# Patient Record
Sex: MALE | Race: WHITE | NOT HISPANIC OR LATINO | Employment: FULL TIME | URBAN - METROPOLITAN AREA
[De-identification: names, ages, dates, MRNs, and addresses within clinical notes are randomized per-mention and may not be internally consistent; named-entity substitution may affect disease eponyms.]

---

## 2019-05-06 ENCOUNTER — OFFICE VISIT (OUTPATIENT)
Dept: ORTHOPEDIC SURGERY | Facility: CLINIC | Age: 44
End: 2019-05-06

## 2019-05-06 VITALS — HEIGHT: 71 IN | HEART RATE: 72 BPM | OXYGEN SATURATION: 98 %

## 2019-05-06 DIAGNOSIS — M25.561 RIGHT KNEE PAIN, UNSPECIFIED CHRONICITY: Primary | ICD-10-CM

## 2019-05-06 DIAGNOSIS — M76.31 ILIOTIBIAL BAND SYNDROME OF RIGHT SIDE: ICD-10-CM

## 2019-05-06 PROCEDURE — 99203 OFFICE O/P NEW LOW 30 MIN: CPT | Performed by: ORTHOPAEDIC SURGERY

## 2019-05-06 RX ORDER — NYSTATIN 100000 [USP'U]/G
POWDER TOPICAL
Refills: 0 | COMMUNITY
Start: 2019-05-02

## 2019-05-06 RX ORDER — FLUTICASONE PROPIONATE 50 MCG
SPRAY, SUSPENSION (ML) NASAL
Refills: 6 | COMMUNITY
Start: 2019-03-13

## 2019-05-06 NOTE — PROGRESS NOTES
[facility] Note    Subjective     Chief Complaint   Patient presents with   • Right Knee - Pain     Pain present for around six to eight weeks. No falls or trauma. No previous treatment.        HPI      Tho Mishra is a 43 y.o. male.  He complains of right knee pain.  He had for about 8 weeks.  Insidious onset.  He is  next month.  Pain is 7 out of 10.  It is burning and stabbing over his proximal tibia laterally.        History reviewed. No pertinent past medical history.   History reviewed. No pertinent surgical history.   History reviewed. No pertinent family history.  Social History     Socioeconomic History   • Marital status:      Spouse name: Not on file   • Number of children: Not on file   • Years of education: Not on file   • Highest education level: Not on file   Tobacco Use   • Smoking status: Never Smoker   • Smokeless tobacco: Never Used   Substance and Sexual Activity   • Alcohol use: No     Frequency: Never   • Drug use: Defer   • Sexual activity: Defer      Current Outpatient Medications on File Prior to Visit   Medication Sig Dispense Refill   • fluticasone (FLONASE) 50 MCG/ACT nasal spray USE 2 YO 2 SPRAYS BY NASAL ROUTE EVERY DAY IN EACH NOSTRIL AS NEEDED  6   • nystatin (MYCOSTATIN) 129017 UNIT/GM powder 1 APPLICATION 2-3 TIMES PER DAY  0     No current facility-administered medications on file prior to visit.       Allergies   Allergen Reactions   • Keflex [Cephalexin] Hives        The following portions of the patient's history were reviewed and updated as appropriate: allergies, current medications, past family history, past medical history, past social history, past surgical history and problem list.    Review of Systems   Constitutional: Positive for activity change.   HENT: Positive for sneezing.    Eyes: Positive for itching.   Respiratory: Negative.    Cardiovascular: Negative.    Gastrointestinal: Negative.    Endocrine:  "Negative.    Genitourinary: Negative.    Musculoskeletal: Positive for arthralgias (right knee) and joint swelling.   Skin: Positive for rash.   Allergic/Immunologic: Positive for environmental allergies.   Neurological: Positive for headaches.   Hematological: Negative.    Psychiatric/Behavioral: Negative.         Objective      Physical Exam  Pulse 72   Ht 180.3 cm (71\")   SpO2 98%     There is no height or weight on file to calculate BMI.        GENERAL APPEARANCE: awake, alert & oriented x 3, in no acute distress and well developed, well nourished  PSYCH: normal mood and affect  LUNGS:  breathing nonlabored, no wheezing  EYES: sclera anicteric, pupils equal  CARDIOVASCULAR: palpable pulses dorsalis pedis, palpable posterior tibial bilaterally. Capillary refill less than 2 seconds  INTEGUMENTARY: skin intact, no clubbing, cyanosis  NEUROLOGIC:  Normal Sensation and reflexes             Ortho Exam  Peripheral Vascular:    Upper Extremity:   Inspection:  Left--no cyanotic nail beds Right--no cyanotic nail beds   Bilateral:  Pink nail beds with brisk capillary refill   Palpation:  Bilateral radial pulse normal  Musculoskeletal:  Global Assessment:  Overall assessment of Lower Extremity Muscle Strength and Tone:  Right quadriceps--5/5  Right hamstrings--5/5  Right tibialis anterior--5/5  Right gastroc soleus--5/5  Right EHL--5/5  Lower Extremity:  Knee/Patella:  No digital clubbing or cyanosis.    Examination of right knee reveals:  Normal deep tendon reflexes, coordination, strength, tone, sensation.  No known fractures or deformities.  Inspection and Palpation:    Right knee:  Tenderness: Gerdy'ss tubercle of the tibia  Effusion:  none  Crepitus:  none  Pulses:  2  Ecchymosis:  None  Warmth:  None   ROM:  Right:  Extension:0    Flexion:135  Left:  Extension:0     Flexion:135  Instability:  Right:  Lachman Test:  Negative, Varus stress test negative,   Valgus stress test negative, Posterior Drawer Test:  " Negative  Deformities/Malalignments/Discrepancies:    Left:  none  Right:  none  Functional Testing:  Right:  Ignacia's test:  Negative  Patella grind test:  Negative  Q-angle:  Normal  Apprehension Sign:  Negative        Imaging/Studies  Imaging Results (last 7 days)     Procedure Component Value Units Date/Time    XR Knee 4+ View Right [[id number]] Resulted:  05/06/19 0920     Updated:  05/06/19 0920    Narrative:       Knee X-Ray  Indication: Pain    Upright AP of bilateral knees. Lateral, skiers and Sunrise views of right   knee     Findings:  No fracture  No bony lesion  Normal soft tissues  Normal joint spaces    No prior studies were available for comparison.            Assessment/Plan        ICD-10-CM ICD-9-CM   1. Right knee pain, unspecified chronicity M25.561 719.46   2. Iliotibial band syndrome of right side M76.31 728.89       Orders Placed This Encounter   Procedures   • XR Knee 4+ View Right   • Ambulatory Referral to Physical Therapy      He has symptoms consistent with tendinitis.  Will start physical therapy.  He will take anti-inflammatories.  He will follow-up in 4 weeks.  If he is better he would cancel.  He may do activity as tolerated.    Medical Decision Making  Management Options : over-the-counter medicine and physical/occupational therapy  Data/Risk: radiology tests and independent visualization of imaging, lab tests, or EMG/NCV    Boris Suarez MD  05/06/19  9:29 AM         EMR Dragon/Transcription disclaimer:  Much of this encounter note is an electronic transcription of spoken language to printed text. Electronic transcription of spoken language may permit erroneous, or at times, nonsensical words or phrases to be inadvertently transcribed. Although I have reviewed the note for such errors, some may still exist.

## 2019-05-13 ENCOUNTER — HOSPITAL ENCOUNTER (OUTPATIENT)
Dept: PHYSICAL THERAPY | Facility: HOSPITAL | Age: 44
Setting detail: THERAPIES SERIES
Discharge: HOME | End: 2019-05-13

## 2019-05-13 DIAGNOSIS — M76.31 IT BAND SYNDROME, RIGHT: Primary | ICD-10-CM

## 2019-05-13 PROCEDURE — 97161 PT EVAL LOW COMPLEX 20 MIN: CPT | Performed by: PHYSICAL THERAPIST

## 2019-05-13 NOTE — THERAPY EVALUATION
Outpatient Physical Therapy Ortho Initial Evaluation  [facility]     Patient Name: Tho Mishra  : 1975  MRN: [id number]  Today's Date: 2019      Visit Date: 2019    There is no problem list on file for this patient.       No past medical history on file.     No past surgical history on file.    Visit Dx:     ICD-10-CM ICD-9-CM   1. It band syndrome, right M76.31 728.89         Patient History     Row Name 19 0900             History    Chief Complaint  Pain  -CR      Type of Pain  Knee pain  -CR      Date Current Problem(s) Began  19  -CR      Brief Description of Current Complaint  42 yo male with right knee pain with kneeling .  He reports pain has remained status quo over the past 8 weeks.  He works as a  which is not limited by chief complaint.  He currenlty denies pain with stair negotiation, squatting, and or walking.  Pain is only elicited with kneeling on knee.  ROM is grossly unaffected.      -CR      Patient/Caregiver Goals  Relieve pain  -CR      Current Tobacco Use  no  -CR      Smoking Status  no  -CR      Patient's Rating of General Health  Good  -CR      Occupation/sports/leisure activities    -CR      Patient seeing anyone else for problem(s)?  Dr. Suarez  -CR      How has patient tried to help current problem?  Active avoidance of aggravating activity  -CR      What clinical tests have you had for this problem?  X-ray  -CR      Results of Clinical Tests  (-)   -CR         Pain     Pain Location  Knee  -CR      Pain at Present  0  -CR      Pain at Worst  8  -CR      Pain Frequency  Intermittent  -CR      Pain Description  Pressure;Sharp  -CR      What Performance Factors Make the Current Problem(s) WORSE?  kneeling  -CR      What Performance Factors Make the Current Problem(s) BETTER?  avoidance of kneeling   -CR      Pain Comments  only elicited with kneeling on affected knee  -CR      Is your sleep disturbed?  No  -CR       Difficulties at work?  no  -CR      Difficulties with ADL's?  yes, kneeling in the yard  -CR      Difficulties with recreational activities?  yes, yard work   -CR         Fall Risk Assessment    Any falls in the past year:  No  -CR      Does patient have a fear of falling  No  -CR         Daily Activities    Primary Language  English  -CR      How does patient learn best?  Demonstration  -CR      Teaching needs identified  Home Exercise Program;Management of Condition  -CR      Patient is concerned about/has problems with  Performing home management (household chores, shopping, care of dependents)  -CR      Does patient have problems with the following?  None  -CR      Barriers to learning  None  -CR      Functional Status  mobility issues preventing performance of daily activities  -CR      Pt Participated in POC and Goals  Yes  -CR         Safety    Are you being hurt, hit, or frightened by anyone at home or in your life?  No  -CR      Are you being neglected by a caregiver  No  -CR        User Key  (r) = Recorded By, (t) = Taken By, (c) = Cosigned By    Initials Name Provider Type    Tho Cuenca, PT Physical Therapist          PT Ortho     Row Name 05/13/19 0900       Special Tests/Palpation    Special Tests/Palpation  Knee  -CR       Knee Palpation    Knee Palpation?  Yes  -CR    Prepatellar Bursa  Tender  -CR    Lateral Joint Line  Tender  -CR       Patellar Accessory Motions    Patellar Accessory Motions Tested?  Yes  -CR    Superior glide  WNL  -CR    Inferior glide  WNL  -CR    Medial glide  WNL  -CR    Lateral glide  WNL  -CR    Lateral tilt  WNL  -CR       Knee Special Tests    Valgus stress (MCL lesion)  Negative;Right:  -CR    Varus stress (LCL lesion)  Right:;Negative  -CR    Ignacia’s test (meniscal lesion)  Right:;Negative  -CR    Patellar grind test (chondromalacia patella)  Right:;Negative  -CR    Noble compression test (distal ITB syndrome)  Right:;Positive  -CR    Prone knee  flexion test (tight quads vs femoral neural tension)  Right:;Positive  -CR       General ROM    RT Lower Ext  Rt Knee Extension/Flexion  -CR       Right Lower Ext    Rt Knee Extension/Flexion AROM  0/145  -CR       MMT (Manual Muscle Testing)    Rt Lower Ext  Rt Knee Extension;Rt Knee Flexion  -CR       MMT Right Lower Ext    Rt Knee Extension MMT, Gross Movement  (4/5) good  -CR    Rt Knee Flexion MMT, Gross Movement  (4/5) good  -CR    Rt Lower Extremity Comments   -- No pain with OKC knee flex/ext testing  -CR       Sensation    Sensation WNL?  WNL  -CR       Flexibility    Flexibility Tested?  Lower Extremity  -CR       Lower Extremity Flexibility    Hamstrings  Bilateral:;Moderately limited  -CR    Hip Flexors  Bilateral:;Moderately limited  -CR    Quadriceps  Bilateral:;Moderately limited  -CR    ITB  Bilateral:;Moderately limited  -CR       Transfers    Sit-Stand Coal (Transfers)  independent  -CR    Stand-Sit Coal (Transfers)  independent  -CR       Gait/Stairs Assessment/Training    Coal Level (Gait)  independent  -CR      User Key  (r) = Recorded By, (t) = Taken By, (c) = Cosigned By    Initials Name Provider Type    Tho Cuenca, PT Physical Therapist                      Therapy Education  Education Details: Client provided written HEP including quad stretch, HS stretch, IT band stretch, and SLR strengthening  Given: HEP, Symptoms/condition management, Pain management  Program: New  How Provided: Verbal, Written, Demonstration  Provided to: Patient  Level of Understanding: Verbalized, Demonstrated     PT OP Goals     Row Name 05/13/19 1000          PT Short Term Goals    STG Date to Achieve  06/10/19  -CR     STG 1  Client will be independent with initial HEP  -CR     STG 1 Progress  New  -CR     STG 2  Client will demonstrate knee strength 5/5  -CR     STG 2 Progress  New  -CR     STG 3  Client will report decreased symptoms by 25% in right anterior knee  -CR     STG 3  Progress  New  -CR        Long Term Goals    LTG Date to Achieve  07/08/19  -CR     LTG 1  client will be independent with comprehensive HEP   -CR     LTG 1 Progress  New  -CR     LTG 2  client will report abolished right knee symptoms   -CR     LTG 2 Progress  New  -CR     LTG 3  client will demonstrate flexibility WNL   -CR     LTG 3 Progress  New  -CR        Time Calculation    PT Goal Re-Cert Due Date  08/11/19  -CR       User Key  (r) = Recorded By, (t) = Taken By, (c) = Cosigned By    Initials Name Provider Type    Tho Cuenca, PT Physical Therapist          PT Assessment/Plan     Row Name 05/13/19 1039          PT Assessment    Functional Limitations  Performance in leisure activities;Performance in self-care ADL;Performance in work activities  -CR     Impairments  Pain;Muscle strength;Impaired flexibility  -CR     Assessment Comments  43 to male present with evolving symptoms of low complexity.  Right knee pain began following heavy kneeling activity 8 weeks ago.  He demonstrates limitations in flexibility including quad and IT band, TTP alonge inferior patellar pole and laterl joint line,  and reports limitations in kneeling ability.  At this time, he denies any restrictions in ROM, difficulty with stair negotiation, or any giving way or effusion.  He will benefit from skilled therapy to assist with addressing flexibility limtiations and progression towards clients goals.    -CR     Please refer to paper survey for additional self-reported information  Yes  -CR     Rehab Potential  Excellent  -CR     Patient/caregiver participated in establishment of treatment plan and goals  Yes  -CR     Patient would benefit from skilled therapy intervention  Yes  -CR        PT Plan    PT Frequency  1x/week  -CR     Predicted Duration of Therapy Intervention (Therapy Eval)  8 visits  -CR     Planned CPT's?  PT EVAL LOW COMPLEXITY: 10026;PT RE-EVAL: 88176;PT MANUAL THERAPY EA 15 MIN: 41160;PT HOT/COLD PACK WC  NONMCARE: 76806;PT THER PROC EA 15 MIN: 34564;PT NEUROMUSC RE-EDUCATION EA 15 MIN: 49277;PT THER ACT EA 15 MIN: 73737;PT GAIT TRAINING EA 15 MIN: 81010;PT SELF CARE/HOME MGMT/TRAIN EA 15: 36989;PT SELF CARE/MGMT/TRAIN 15 MIN: 21007  -CR     Physical Therapy Interventions (Optional Details)  ROM (Range of Motion);stair training;home exercise program;joint mobilization;manual therapy techniques  -CR     PT Plan Comments  flexibility, quad strengtheneing, glute strenghtening, body mechanics and HEP development  -CR       User Key  (r) = Recorded By, (t) = Taken By, (c) = Cosigned By    Initials Name Provider Type    Tho Cuenca, PT Physical Therapist                              Outcome Measure Options: Lower Extremity Functional Scale (LEFS)  Lower Extremity Functional Index  Any of your usual work, housework or school activities: A little bit of difficulty  Your usual hobbies, recreational or sporting activities: A little bit of difficulty  Getting into or out of the bath: No difficulty  Walking between rooms: No difficulty  Putting on your shoes or socks: No difficulty  Squatting: A little bit of difficulty  Lifting an object, like a bag of groceries from the floor: No difficulty  Performing light activities around your home: No difficulty  Performing heavy activities around your home: A little bit of difficulty  Getting into or out of a car: No difficulty  Walking 2 blocks: No difficulty  Walking a mile: No difficulty  Going up or down 10 stairs (about 1 flight of stairs): No difficulty  Standing for 1 hour: No difficulty  Sitting for 1 hour: No difficulty  Running on even ground: No difficulty  Running on uneven ground: No difficulty  Making sharp turns while running fast: No difficulty  Hopping: No difficulty  Rolling over in bed: No difficulty  Total: 76      Time Calculation:     Start Time: 0945     Therapy Charges for Today     Code Description Service Date Service Provider Modifiers Qty     77376965874  PT EVAL LOW COMPLEXITY 3 5/13/2019 Tho Lombardo, PT GP 1          PT G-Codes  Outcome Measure Options: Lower Extremity Functional Scale (LEFS)  Total: 76         Tho Lombardo, PT  5/13/2019

## 2019-05-24 ENCOUNTER — HOSPITAL ENCOUNTER (OUTPATIENT)
Dept: PHYSICAL THERAPY | Facility: HOSPITAL | Age: 44
Setting detail: THERAPIES SERIES
Discharge: HOME | End: 2019-05-24

## 2019-05-24 DIAGNOSIS — M76.31 IT BAND SYNDROME, RIGHT: Primary | ICD-10-CM

## 2019-05-24 PROCEDURE — 97110 THERAPEUTIC EXERCISES: CPT | Performed by: PHYSICAL THERAPIST

## 2019-05-24 NOTE — THERAPY TREATMENT NOTE
Outpatient Physical Therapy Ortho Treatment Note  [facility]     Patient Name: Tho Mishra  : 1975  MRN: [id number]  Today's Date: 2019      Visit Date: 2019    Visit Dx:    ICD-10-CM ICD-9-CM   1. It band syndrome, right M76.31 728.89       There is no problem list on file for this patient.       No past medical history on file.     No past surgical history on file.                    PT Assessment/Plan     Row Name 19 0904          PT Assessment    Assessment Comments  Addressing ITB interventions today reveals difficutly wiht ITB wall stretch.  Modified program to include foam rolling and ITB work with strap stretch enables client to kneel without distal pain today.  Client will follow up with PT next week to re assess carryover prior to MD appointment.   -CR        PT Plan    PT Plan Comments  Re assess foam roll and distal ITB stretch with strap   -CR       User Key  (r) = Recorded By, (t) = Taken By, (c) = Cosigned By    Initials Name Provider Type    Tho Cuenca, PT Physical Therapist            Exercises     Row Name 19 0800             Subjective Comments    Subjective Comments  Client reports he continues to experience similar discomfort in lateral knee joint line with kneeling.  No pain reported with ambulation or ADLs at this time.   -CR         Subjective Pain    Able to rate subjective pain?  yes  -CR      Pre-Treatment Pain Level  0  -CR      Post-Treatment Pain Level  0  -CR         Total Minutes    34705 - PT Therapeutic Exercise Minutes  25  -CR         Exercise 1    Exercise Name 1  Elliptical  -CR      Time 1  6  -CR         Exercise 2    Exercise Name 2  ITB stretch   -CR      Reps 2  5  -CR      Time 2  10  -CR         Exercise 3    Exercise Name 3  Foam rolling   -CR      Time 3  5  -CR         Exercise 4    Exercise Name 4  wall ITB stretch   -CR      Reps 4  5  -CR      Time 4  10 seconds  -CR         Exercise 5    Exercise Name 5   therapist assisted HS, ITB, quad stretch, hip stretch  -CR      Reps 5  5  -CR      Time 5  10 seconds   -CR      Additional Comments  all motions  -CR        User Key  (r) = Recorded By, (t) = Taken By, (c) = Cosigned By    Initials Name Provider Type    Tho Cuenca, PT Physical Therapist                                          Time Calculation:   Start Time: 0825  Therapy Charges for Today     Code Description Service Date Service Provider Modifiers Qty    50802856720  PT THER PROC EA 15 MIN 5/24/2019 Tho Lombardo, PT GP 2                    Tho Lombardo, PT  5/24/2019

## 2019-05-30 ENCOUNTER — HOSPITAL ENCOUNTER (OUTPATIENT)
Dept: PHYSICAL THERAPY | Facility: HOSPITAL | Age: 44
Setting detail: THERAPIES SERIES
Discharge: HOME | End: 2019-05-30

## 2019-05-30 DIAGNOSIS — M76.31 IT BAND SYNDROME, RIGHT: Primary | ICD-10-CM

## 2019-05-30 PROCEDURE — 97110 THERAPEUTIC EXERCISES: CPT | Performed by: PHYSICAL THERAPIST

## 2019-05-30 NOTE — THERAPY TREATMENT NOTE
Outpatient Physical Therapy Ortho Treatment Note  [facility]     Patient Name: Tho Mishra  : 1975  MRN: [id number]  Today's Date: 2019      Visit Date: 2019    Visit Dx:    ICD-10-CM ICD-9-CM   1. It band syndrome, right M76.31 728.89       There is no problem list on file for this patient.       No past medical history on file.     No past surgical history on file.                    PT Assessment/Plan     Row Name 19 1546          PT Assessment    Assessment Comments  Client currently demonstrates resolution of symptoms and self managment with ITB strap stretch and foam rolling. He willl follow up with therapy if needed in 2 weeks.  He will trial HEP at this time and overall has done very well.   -CR        PT Plan    PT Plan Comments  Follow up as needed in 2 weeks.  Continue with self care and HEP management  -CR       User Key  (r) = Recorded By, (t) = Taken By, (c) = Cosigned By    Initials Name Provider Type    Tho Cuenca PT Physical Therapist            Exercises     Row Name 19 1500             Subjective Comments    Subjective Comments  Client reports since previous treatments feels interventions have been effective at limiting knee pain   -CR         Subjective Pain    Able to rate subjective pain?  yes  -CR      Pre-Treatment Pain Level  0  -CR      Post-Treatment Pain Level  0  -CR         Total Minutes    03468 - PT Therapeutic Exercise Minutes  23  -CR         Exercise 1    Exercise Name 1  Elliptical  -CR      Time 1  8  -CR         Exercise 2    Exercise Name 2  ITB stretch   -CR      Reps 2  5  -CR      Time 2  10  -CR      Additional Comments  strap stretch  -CR         Exercise 3    Exercise Name 3  Foam rolling   -CR      Time 3  5  -CR         Exercise 4    Exercise Name 4  review of HEP included in TE   -CR        User Key  (r) = Recorded By, (t) = Taken By, (c) = Cosigned By    Initials Name Provider Type    Tho Cuenca, PT  Physical Therapist                           Therapy Education  Education Details: Reviewed HEP  Program: Reinforced              Time Calculation:   Start Time: 1515  Therapy Charges for Today     Code Description Service Date Service Provider Modifiers Qty    36894649540 HC PT THER PROC EA 15 MIN 5/30/2019 Tho Lombardo, PT GP 2                    Tho Lombardo, PT  5/30/2019

## 2019-06-03 ENCOUNTER — OFFICE VISIT (OUTPATIENT)
Dept: ORTHOPEDIC SURGERY | Facility: CLINIC | Age: 44
End: 2019-06-03

## 2019-06-03 VITALS — HEART RATE: 56 BPM | BODY MASS INDEX: 26.2 KG/M2 | OXYGEN SATURATION: 97 % | HEIGHT: 71 IN | WEIGHT: 187.17 LBS

## 2019-06-03 DIAGNOSIS — M76.51 PATELLAR TENDINITIS OF RIGHT KNEE: Primary | ICD-10-CM

## 2019-06-03 PROCEDURE — 99212 OFFICE O/P EST SF 10 MIN: CPT | Performed by: ORTHOPAEDIC SURGERY

## 2019-06-03 NOTE — PROGRESS NOTES
[facility] [place] [facility] [place]    Subjective     Chief Complaint   Patient presents with   • Right Knee - Follow-up     4 week follow up        HPI      Tho Mishra is a 44 y.o. male.  He is doing better.  He is slowly improving physical therapy.  Pain is 3 out of 10 it is burning stabbing and shooting.        History reviewed. No pertinent past medical history.   History reviewed. No pertinent surgical history.   History reviewed. No pertinent family history.  Social History     Socioeconomic History   • Marital status:      Spouse name: Not on file   • Number of children: Not on file   • Years of education: Not on file   • Highest education level: Not on file   Tobacco Use   • Smoking status: Never Smoker   • Smokeless tobacco: Never Used   Substance and Sexual Activity   • Alcohol use: No     Frequency: Never   • Drug use: Defer   • Sexual activity: Defer      Current Outpatient Medications on File Prior to Visit   Medication Sig Dispense Refill   • fluticasone (FLONASE) 50 MCG/ACT nasal spray USE 2 YO 2 SPRAYS BY NASAL ROUTE EVERY DAY IN EACH NOSTRIL AS NEEDED  6   • nystatin (MYCOSTATIN) 651616 UNIT/GM powder 1 APPLICATION 2-3 TIMES PER DAY  0     No current facility-administered medications on file prior to visit.       Allergies   Allergen Reactions   • Keflex [Cephalexin] Hives        The following portions of the patient's history were reviewed and updated as appropriate: allergies, current medications, past family history, past medical history, past social history, past surgical history and problem list.    Review of Systems   Constitutional: Positive for activity change.   HENT: Negative.    Eyes: Negative.    Respiratory: Negative.    Cardiovascular: Negative.    Gastrointestinal: Negative.    Endocrine: Negative.    Genitourinary: Negative.    Musculoskeletal: Positive for arthralgias.   Skin: Negative.    Allergic/Immunologic: Negative.    Neurological: Negative.   "  Hematological: Negative.    Psychiatric/Behavioral: Negative.         Objective      Physical Exam  Pulse 56   Ht 180.3 cm (71\")   Wt 84.9 kg (187 lb 2.7 oz)   SpO2 97%   BMI 26.11 kg/m²     Body mass index is 26.11 kg/m².        GENERAL APPEARANCE: awake, alert & oriented x 3, in no acute distress and well developed, well nourished  PSYCH: normal mood and affect    Ortho Exam  Peripheral Vascular:    Upper Extremity:   Inspection:  Left--no cyanotic nail beds Right--no cyanotic nail beds   Bilateral:  Pink nail beds with brisk capillary refill   Palpation:  Bilateral radial pulse normal  Musculoskeletal:  Global Assessment:  Overall assessment of Lower Extremity Muscle Strength and Tone:  Right quadriceps--5/5  Right hamstrings--5/5  Right tibialis anterior--5/5  Right gastroc soleus--5/5  Right EHL--5/5  Lower Extremity:  Knee/Patella:  No digital clubbing or cyanosis.    Examination of right knee reveals:  Normal deep tendon reflexes, coordination, strength, tone, sensation.  No known fractures or deformities.  Inspection and Palpation:    Right knee:  Tenderness: Patella tendon and tibial tubercle  Effusion:  none  Crepitus:  none  Pulses:  2+  Ecchymosis:  None  Warmth:  None   ROM:  Right:  Extension:0    Flexion:135  Left:  Extension:0     Flexion:135  Instability:  Right:  Lachman Test:  Negative, Varus stress test negative,   Valgus stress test negative, Posterior Drawer Test:  Negative  Deformities/Malalignments/Discrepancies:    Left:  none  Right:  none  Functional Testing:  Right:  Ignacia's test:  Negative  Patella grind test:  Negative  Q-angle:  Normal  Apprehension Sign:  Negative        Imaging/Studies  Imaging Results (last 7 days)     ** No results found for the last 168 hours. **          Assessment/Plan        ICD-10-CM ICD-9-CM   1. Patellar tendinitis of right knee M76.51 726.64       Orders Placed This Encounter   Procedures   • Ambulatory Referral to Physical Therapy      He will " continue physical therapy and follow-up as needed.  He is getting better.  Medical Decision Making  Management Options : over-the-counter medicine and physical/occupational therapy    Boris Suarez MD  06/03/19  9:41 AM         EMR Dragon/Transcription disclaimer:  Much of this encounter note is an electronic transcription of spoken language to printed text. Electronic transcription of spoken language may permit erroneous, or at times, nonsensical words or phrases to be inadvertently transcribed. Although I have reviewed the note for such errors, some may still exist.

## 2019-06-12 ENCOUNTER — DOCUMENTATION (OUTPATIENT)
Dept: PHYSICAL THERAPY | Facility: HOSPITAL | Age: 44
End: 2019-06-12

## 2019-06-12 DIAGNOSIS — M76.31 IT BAND SYNDROME, RIGHT: Primary | ICD-10-CM

## 2022-06-01 ENCOUNTER — HOSPITAL ENCOUNTER (OUTPATIENT)
Dept: RADIOLOGY | Facility: CLINIC | Age: 47
Discharge: HOME | End: 2022-06-01

## 2022-06-01 PROCEDURE — 87086 URINE CULTURE/COLONY COUNT: CPT | Performed by: FAMILY MEDICINE

## 2022-06-01 PROCEDURE — 71046 X-RAY EXAM CHEST 2 VIEWS: CPT | Performed by: FAMILY MEDICINE

## 2022-06-01 PROCEDURE — U0004 COV-19 TEST NON-CDC HGH THRU: HCPCS | Performed by: FAMILY MEDICINE
